# Patient Record
Sex: FEMALE | Race: BLACK OR AFRICAN AMERICAN | NOT HISPANIC OR LATINO | URBAN - METROPOLITAN AREA
[De-identification: names, ages, dates, MRNs, and addresses within clinical notes are randomized per-mention and may not be internally consistent; named-entity substitution may affect disease eponyms.]

---

## 2024-07-21 ENCOUNTER — EMERGENCY (EMERGENCY)
Facility: HOSPITAL | Age: 29
LOS: 1 days | Discharge: HOME | End: 2024-07-21
Admitting: STUDENT IN AN ORGANIZED HEALTH CARE EDUCATION/TRAINING PROGRAM
Payer: COMMERCIAL

## 2024-07-21 VITALS
OXYGEN SATURATION: 97 % | HEIGHT: 65 IN | HEART RATE: 89 BPM | RESPIRATION RATE: 18 BRPM | WEIGHT: 139.99 LBS | SYSTOLIC BLOOD PRESSURE: 117 MMHG | TEMPERATURE: 98 F | DIASTOLIC BLOOD PRESSURE: 77 MMHG

## 2024-07-21 PROCEDURE — 99283 EMERGENCY DEPT VISIT LOW MDM: CPT

## 2024-07-21 NOTE — ED ADULT TRIAGE NOTE - CHIEF COMPLAINT QUOTE
Pt co R eye infxn x1wk. Using optic neomycin +polymixin B and PO doxycycline and feels eye pain is worsening. Denies vision changes, fevers.

## 2024-07-21 NOTE — ED ADULT NURSE NOTE - OBJECTIVE STATEMENT
pt received into proc room A&Ox4 ambulatory appears comfortable arrives via walk in triage for eval of right eye pain and redness increased tearing x 1 week compliant pt received into proc room A&Ox4 ambulatory appears comfortable arrives via walk in triage for eval of right eye pain and redness increased tearing x 1 week compliant with po doxy and two different eye drops, has been to her pmd and urg care for these meds has not made appt with eye doctor or seen eye doctor. No blurry vision changes in vision trauma or injury. Neuro intact PERRLA. Fluorecin tetracaine given to pa for eval

## 2024-07-21 NOTE — ED PROVIDER NOTE - NSFOLLOWUPCLINICS_GEN_ALL_ED_FT
[facility]  Ophthalmology  [address] [place] [address]  Phone: [phone]  Fax:

## 2024-07-21 NOTE — ED PROVIDER NOTE - NSFOLLOWUPINSTRUCTIONS_ED_ALL_ED_FT
use the eye ointment (stop using the current eye drops), use artificial tears throughout the day  limit straining the eye (do not be on your phone or watching tv all day)  follow up with an eye doctor in 1-2 days  return to ed for any worsening or concerning symptoms

## 2024-07-21 NOTE — ED PROVIDER NOTE - CLINICAL SUMMARY MEDICAL DECISION MAKING FREE TEXT BOX
pt c/o r eye redness and discomfort x 1wk, seen by ucc and pmd and given po and topical abx and antihistamines, has not f/u w/optho, no acute changes in symptoms today but symptoms persist, no ulcer/abrasion/fb on exam, + conjunctival injection w/o any signs of septal or pre septal cellulitis or bacterial conjunctivitis, will rx erythromycin oint and pt will f/u w/[facility] for eval and further tx. pt understands and agrees w/plan, strict return precautions given

## 2024-07-21 NOTE — ED PROVIDER NOTE - OBJECTIVE STATEMENT
The pt is a 28 y/o F, who presents to ED c/o R eye redness and discomfort x 1 wk, Was seen at [facility] and by pmd, given po abx, antihistamines, and abx eye gtts; has not f/u w/optho, here today due to persistent symptoms. + tearing, + redness and discomfort to eye. Denies eye trauma, fb sensation, visual changes, fevers, chills, discharge from eye.

## 2024-07-21 NOTE — ED PROVIDER NOTE - EYES, MLM
OS wnl. OD w/conjunctival injection, no swelling, no discharge, PERRLA. OD anesthetized w/2 gtts tetracaine, stained w/fluoro strip - no fb, no corneal abrasion, no corneal ulcer

## 2024-07-21 NOTE — ED PROVIDER NOTE - PATIENT PORTAL LINK FT
You can access the FollowMyHealth Patient Portal offered by [facility] by registering at the following website: [email or web address]. By joining [facility]’s FollowMyHealth portal, you will also be able to view your health information using other applications (apps) compatible with our system.

## 2024-07-22 ENCOUNTER — NON-APPOINTMENT (OUTPATIENT)
Age: 29
End: 2024-07-22

## 2024-07-22 ENCOUNTER — APPOINTMENT (OUTPATIENT)
Dept: OPHTHALMOLOGY | Facility: CLINIC | Age: 29
End: 2024-07-22
Payer: COMMERCIAL

## 2024-07-22 PROCEDURE — 92004 COMPRE OPH EXAM NEW PT 1/>: CPT

## 2024-07-23 DIAGNOSIS — H57.89 OTHER SPECIFIED DISORDERS OF EYE AND ADNEXA: ICD-10-CM

## 2024-07-25 ENCOUNTER — NON-APPOINTMENT (OUTPATIENT)
Age: 29
End: 2024-07-25

## 2024-07-25 ENCOUNTER — APPOINTMENT (OUTPATIENT)
Dept: OPHTHALMOLOGY | Facility: CLINIC | Age: 29
End: 2024-07-25
Payer: COMMERCIAL

## 2024-07-25 PROCEDURE — 92012 INTRM OPH EXAM EST PATIENT: CPT

## 2024-07-29 ENCOUNTER — APPOINTMENT (OUTPATIENT)
Dept: OPHTHALMOLOGY | Facility: CLINIC | Age: 29
End: 2024-07-29
Payer: COMMERCIAL

## 2024-07-29 ENCOUNTER — NON-APPOINTMENT (OUTPATIENT)
Age: 29
End: 2024-07-29

## 2024-07-29 PROCEDURE — 92012 INTRM OPH EXAM EST PATIENT: CPT

## 2024-08-05 ENCOUNTER — APPOINTMENT (OUTPATIENT)
Dept: OPHTHALMOLOGY | Facility: CLINIC | Age: 29
End: 2024-08-05

## 2024-08-08 ENCOUNTER — APPOINTMENT (OUTPATIENT)
Dept: OPHTHALMOLOGY | Facility: CLINIC | Age: 29
End: 2024-08-08

## 2024-08-08 ENCOUNTER — NON-APPOINTMENT (OUTPATIENT)
Age: 29
End: 2024-08-08

## 2024-08-08 PROCEDURE — 92134 CPTRZ OPH DX IMG PST SGM RTA: CPT

## 2024-08-08 PROCEDURE — 92014 COMPRE OPH EXAM EST PT 1/>: CPT | Mod: 25

## 2024-09-09 PROBLEM — Z00.00 ENCOUNTER FOR PREVENTIVE HEALTH EXAMINATION: Status: ACTIVE | Noted: 2024-09-09

## 2025-03-24 ENCOUNTER — APPOINTMENT (OUTPATIENT)
Dept: OPHTHALMOLOGY | Facility: CLINIC | Age: 30
End: 2025-03-24